# Patient Record
Sex: FEMALE | HISPANIC OR LATINO | Employment: UNEMPLOYED | URBAN - METROPOLITAN AREA
[De-identification: names, ages, dates, MRNs, and addresses within clinical notes are randomized per-mention and may not be internally consistent; named-entity substitution may affect disease eponyms.]

---
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
## 2024-10-01 ENCOUNTER — OFFICE VISIT (OUTPATIENT)
Dept: OPTOMETRY | Facility: CLINIC | Age: 19
End: 2024-10-01
Payer: COMMERCIAL

## 2024-10-01 DIAGNOSIS — H52.13 MYOPIA, BILATERAL: Primary | ICD-10-CM

## 2024-10-01 PROCEDURE — 92004 COMPRE OPH EXAM NEW PT 1/>: CPT | Performed by: OPTOMETRIST

## 2024-10-01 PROCEDURE — 92015 DETERMINE REFRACTIVE STATE: CPT | Performed by: OPTOMETRIST

## 2024-10-01 ASSESSMENT — REFRACTION_MANIFEST
OS_SPHERE: -1.75
OD_SPHERE: -1.25
OD_AXIS: 087
OS_CYLINDER: +0.75
OS_AXIS: 080
OD_CYLINDER: +0.25
OS_CYLINDER: +0.75
OD_SPHERE: -1.50
OS_AXIS: 080
OD_CYLINDER: +0.50
OS_SPHERE: -1.50
OD_AXIS: 090

## 2024-10-01 ASSESSMENT — CONF VISUAL FIELD
OS_SUPERIOR_TEMPORAL_RESTRICTION: 0
OS_INFERIOR_TEMPORAL_RESTRICTION: 0
OS_SUPERIOR_NASAL_RESTRICTION: 0
OS_NORMAL: 1
OD_SUPERIOR_NASAL_RESTRICTION: 0
OS_INFERIOR_NASAL_RESTRICTION: 0
OD_INFERIOR_NASAL_RESTRICTION: 0
OD_NORMAL: 1
OD_SUPERIOR_TEMPORAL_RESTRICTION: 0
OD_INFERIOR_TEMPORAL_RESTRICTION: 0

## 2024-10-01 ASSESSMENT — CUP TO DISC RATIO
OS_RATIO: 0.2
OD_RATIO: 0.2

## 2024-10-01 ASSESSMENT — KERATOMETRY
OD_K2POWER_DIOPTERS: 43.25
OS_AXISANGLE2_DEGREES: 174
OD_AXISANGLE_DEGREES: 85
OS_K1POWER_DIOPTERS: 42.00
OS_AXISANGLE_DEGREES: 84
OD_AXISANGLE2_DEGREES: 175
OS_K2POWER_DIOPTERS: 44.00
OD_K1POWER_DIOPTERS: 42.25

## 2024-10-01 ASSESSMENT — VISUAL ACUITY
METHOD: SNELLEN - LINEAR
OD_SC: 20/40
OD_SC: J1
OS_SC: J1
OS_SC: 20/40

## 2024-10-01 ASSESSMENT — SLIT LAMP EXAM - LIDS
COMMENTS: NORMAL
COMMENTS: NORMAL

## 2024-10-01 ASSESSMENT — EXTERNAL EXAM - LEFT EYE: OS_EXAM: NORMAL

## 2024-10-01 ASSESSMENT — TONOMETRY: IOP_METHOD: BOTH EYES NORMAL BY PALPATION

## 2024-10-01 ASSESSMENT — EXTERNAL EXAM - RIGHT EYE: OD_EXAM: NORMAL

## 2024-10-01 NOTE — PROGRESS NOTES
Chief Complaint   Patient presents with    Refraction     Here for refraction.     Jarad Bullock - Optometric Assistant      OBJECTIVE: See Ophthalmology exam    ASSESSMENT:    ICD-10-CM    1. Myopia, bilateral - Both Eyes  H52.13         PLAN:  Patient Instructions   Myopia is a result of long eyes. It is commonly referred to as near-sightedness. Seeing clearly in the distance is the main challenge.    Astigmatism results from curvature differential in the cornea and crystalline lens which can cause a distorted image, as light rays are prevented from meeting at a common focus.    Eyeglass prescription given.    Recommend annual eye exams.      Shireen Gutiérrez O.D.  [facility] [place]   [address] [place] [address]    [phone]

## 2024-10-01 NOTE — LETTER
10/1/2024      Vy Gonzalez  [address]  [place] [address]      Dear Colleague,    Thank you for referring your patient, Vy Gonzalez, to the [facility]. Please see a copy of my visit note below.    Chief Complaint   Patient presents with     Refraction     Here for refraction.     Jarad Bullock - Optometric Assistant      OBJECTIVE: See Ophthalmology exam    ASSESSMENT:    ICD-10-CM    1. Myopia, bilateral - Both Eyes  H52.13         PLAN:  Patient Instructions   Myopia is a result of long eyes. It is commonly referred to as near-sightedness. Seeing clearly in the distance is the main challenge.    Astigmatism results from curvature differential in the cornea and crystalline lens which can cause a distorted image, as light rays are prevented from meeting at a common focus.    Eyeglass prescription given.    Recommend annual eye exams.      Shireen Gutiérrez O.D.  [facility]   [address] [place] [address]    [phone]          Again, thank you for allowing me to participate in the care of your patient.        Sincerely,        Shireen Gutiérrez OD

## 2024-10-01 NOTE — PATIENT INSTRUCTIONS
Myopia is a result of long eyes. It is commonly referred to as near-sightedness. Seeing clearly in the distance is the main challenge.    Astigmatism results from curvature differential in the cornea and crystalline lens which can cause a distorted image, as light rays are prevented from meeting at a common focus.    Eyeglass prescription given.    Recommend annual eye exams.      Shireen Gutiérrez O.D.  [address] [address] [phone]

## 2024-10-11 ENCOUNTER — APPOINTMENT (OUTPATIENT)
Dept: OPTOMETRY | Facility: CLINIC | Age: 19
End: 2024-10-11
Payer: COMMERCIAL

## 2024-10-11 PROCEDURE — 92340 FIT SPECTACLES MONOFOCAL: CPT | Performed by: OPTOMETRIST

## 2025-03-03 ENCOUNTER — TELEPHONE (OUTPATIENT)
Dept: OBGYN | Facility: CLINIC | Age: 20
End: 2025-03-03
Payer: COMMERCIAL

## 2025-03-03 NOTE — TELEPHONE ENCOUNTER
[facility] Call Center    Phone Message    May a detailed message be left on voicemail: yes     Reason for Call: Appointment Intake    Referring Provider Name: Self referred   Diagnosis and/or Symptoms: Miscarriage management     Pt is requesting to schedule for miscarriage management. Please review and call pt to advise at # [phone].    Action Taken: Message routed to:  Other: RD OB    Travel Screening: Not Applicable     Date of Service:

## 2025-03-03 NOTE — TELEPHONE ENCOUNTER
Pt called to get scheduled for visit with a provider to be evaluated due to SAB x2.  Sent to the  to set up appointments

## 2025-03-13 ENCOUNTER — OFFICE VISIT (OUTPATIENT)
Dept: OBGYN | Facility: CLINIC | Age: 20
End: 2025-03-13
Attending: ADVANCED PRACTICE MIDWIFE
Payer: COMMERCIAL

## 2025-03-13 VITALS
DIASTOLIC BLOOD PRESSURE: 77 MMHG | HEIGHT: 61 IN | SYSTOLIC BLOOD PRESSURE: 116 MMHG | WEIGHT: 104.9 LBS | BODY MASS INDEX: 19.8 KG/M2 | HEART RATE: 93 BPM

## 2025-03-13 DIAGNOSIS — Z11.3 SCREEN FOR STD (SEXUALLY TRANSMITTED DISEASE): ICD-10-CM

## 2025-03-13 DIAGNOSIS — Z84.81 FAMILY HISTORY OF GENETIC DISEASE CARRIER: ICD-10-CM

## 2025-03-13 DIAGNOSIS — Z00.00 ROUTINE GENERAL MEDICAL EXAMINATION AT A HEALTH CARE FACILITY: Primary | ICD-10-CM

## 2025-03-13 DIAGNOSIS — O03.9 SAB (SPONTANEOUS ABORTION): ICD-10-CM

## 2025-03-13 PROCEDURE — 87491 CHLMYD TRACH DNA AMP PROBE: CPT | Performed by: ADVANCED PRACTICE MIDWIFE

## 2025-03-13 RX ORDER — OMEGA-3 FATTY ACIDS/FISH OIL 300-1000MG
CAPSULE ORAL
COMMUNITY

## 2025-03-13 ASSESSMENT — ANXIETY QUESTIONNAIRES
1. FEELING NERVOUS, ANXIOUS, OR ON EDGE: SEVERAL DAYS
GAD7 TOTAL SCORE: 2
5. BEING SO RESTLESS THAT IT IS HARD TO SIT STILL: NOT AT ALL
2. NOT BEING ABLE TO STOP OR CONTROL WORRYING: NOT AT ALL
7. FEELING AFRAID AS IF SOMETHING AWFUL MIGHT HAPPEN: NOT AT ALL
8. IF YOU CHECKED OFF ANY PROBLEMS, HOW DIFFICULT HAVE THESE MADE IT FOR YOU TO DO YOUR WORK, TAKE CARE OF THINGS AT HOME, OR GET ALONG WITH OTHER PEOPLE?: NOT DIFFICULT AT ALL
3. WORRYING TOO MUCH ABOUT DIFFERENT THINGS: NOT AT ALL
6. BECOMING EASILY ANNOYED OR IRRITABLE: SEVERAL DAYS
GAD7 TOTAL SCORE: 2
4. TROUBLE RELAXING: NOT AT ALL
IF YOU CHECKED OFF ANY PROBLEMS ON THIS QUESTIONNAIRE, HOW DIFFICULT HAVE THESE PROBLEMS MADE IT FOR YOU TO DO YOUR WORK, TAKE CARE OF THINGS AT HOME, OR GET ALONG WITH OTHER PEOPLE: NOT DIFFICULT AT ALL
7. FEELING AFRAID AS IF SOMETHING AWFUL MIGHT HAPPEN: NOT AT ALL
GAD7 TOTAL SCORE: 2

## 2025-03-13 NOTE — PROGRESS NOTES
Progress Note    SUBJECTIVE:  Vy Gonzalez is an 19 year old, , who requests an Annual Preventive Exam.       Concerns today include:   Paragard IUD in place x 2 months, mild cramping and managable flow  Hx of reg menses  hx of SAB x 2, early losses  Her mother has told her that she is a carrier for a genetic/chromosome abnormality, unsure what it is.  Worried this is connected to her 2 early SABs  Has had the same male partner for 3 years and they have never used birth control, only one pregnancy that ended in early SAB in 2025. Worried that he may have fertility issues as well    Has been seen [facility], [facility]        Menstrual History:      3/13/2025     2:40 PM   Menstrual History   LAST MENSTRUAL PERIOD 2025       HISTORY:  Prescription Medications as of 3/13/2025         Rx Number Disp Refills Start End Last Dispensed Date Next Fill Date Owning Pharmacy    omega 3 1000 MG CAPS  -- --  --       Sig: Take by mouth.    Class: Historical    Route: Oral          No Known Allergies  Immunization History   Administered Date(s) Administered    COVID-19 Bivalent 12+ (Pfizer) 11/10/2022    COVID-19 Monovalent 12+ (Pfizer ) 2022, 2022    Comvax (HIB/HepB) 2005, 2005, 2005, 2005    DTAP (<7y) 2005, 2005, 2005, 2005, 2005, 2006    DTAP-IPV, <7Y (QUADRACEL/KINRIX) 2010    DTaP/HepB/IPV 2005    Flu, Unspecified 2011, 2012    D0w7-60 Novel Flu 2010, 2010    HIB (PRP-T) 2005, 2005, 2006    HPV Quadrivalent 07/15/2015, 2015    HPV9 07/15/2015, 2015, 2016    Hepatitis A (Vaqta/Havrix)(Peds 12m-18y) 2006, 2010    Historic Hib Hib-titer 2005, 2005    Influenza (H1N1) 2010, 2010    Influenza (IIV3) PF 2005, 2005, 2006    Influenza  Intranasal Vaccine 10/31/2014    Influenza Vaccine >6 months,quad, PF 10/17/2018, 10/21/2019, 2020, 2021, 11/10/2022, 2023    Influenza Vaccine, 6+MO IM (QUADRIVALENT W/PRESERVATIVES) 2015, 2016, 10/25/2017, 2021, 2023    Influenza, Split Virus, Trivalent, Pf (Fluzone\Fluarix) 10/11/2007, 2007, 2008, 10/17/2009, 10/23/2013, 2024    MMR (MMRII) 2005, 2009    Meningococcal ACWY (Menveo ) 2016, 2021    Meningococcal B (Bexsero ) 2023    Nasal Influenza Vaccine 2-49 (FluMist) 10/31/2014    Pneumococcal (PCV 7) 2005, 2005, 2005, 2005, 2005    Polio, Unspecified 2005, 2005, 2005    Poliovirus, inactivated (IPV) 2005, 2005, 2005, 2005, 2005    TDAP (Adacel,Boostrix) 07/15/2015    Typhoid IM 2021    Varicella (Varivax) 2006, 2009       OB History    Para Term  AB Living   2 0 0 0 2 0   SAB IAB Ectopic Multiple Live Births   2 0 0 0 0     Past Medical History:   Diagnosis Date    Spontaneous      age 14 and 18.    Venous lymphatic malformation      Past Surgical History:   Procedure Laterality Date    HEMANGIOMA EXCISION Right      Family History   Problem Relation Age of Onset    Diabetes Mother 30    No Known Problems Father     No Known Problems Sister     No Known Problems Sister     Autism Spectrum Disorder Brother     No Known Problems Brother     Diabetes Maternal Grandmother     Diabetes Maternal Uncle      Social History     Socioeconomic History    Marital status: Single     Spouse name: None    Number of children: None    Years of education: None    Highest education level: None   Occupational History    Occupation: . 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     Comment:  one partner     Social Drivers of Health     Financial Resource Strain: Not on File (3/12/2024)    Received from [facility]    Financial Resource Strain     Financial Resource Strain: 0   Food Insecurity: Not on File (2024)    Received from [facility]    Food Insecurity     Food: 0   Transportation Needs: Not on File (3/12/2024)    Received from [facility]    Transportation Needs     Transportation: 0   Physical Activity: Not on File (3/12/2024)    Received from [facility]    Physical Activity     Physical Activity: 0   Stress: Not on File (3/12/2024)    Received from [facility]    Stress     Stress: 0   Social Connections: Not on File (9/15/2024)    Received from [facility]    Social Connections     Connectedness: 0   Housing Stability: Not on File (3/12/2024)    Received from [facility]    Housing Stability     Housin       ROS       No data to display                  EXAM:  Blood pressure 116/77, pulse 93, height 1.549 m (5' 1\"), weight 47.6 kg (104 lb 14.4 oz), last menstrual period 2025. Body mass index is 19.82 kg/m .  General - pleasant female in no acute distress.  Skin - no suspicious lesions or rashes  EENT-  PERRLA, euthyroid with out palpable nodules  Neck - supple without lymphadenopathy.  Lungs - clear to auscultation bilaterally.  Heart - regular rate and rhythm without murmur.  Abdomen - soft, nontender, nondistended, no masses or organomegaly noted.  Musculoskeletal - no gross deformities.  Neurological - normal strength, sensation, and mental status.    Breast Exam:  Breast: Without visible skin changes. No dimpling or lesions seen.   Breasts supple, non-tender with palpation, no dominant mass, nodularity, or nipple discharge noted bilaterally. Axillary nodes negative.  Inverted nipples, most prominent on left side    Pelvic " Exam:  EG/BUS: Normal genital architecture without lesions, erythema or abnormal secretions Bartholin's, Urethra, [place]'s normal   Urethral meatus: normal   Urethra: no masses, tenderness, or scarring   Bladder: no masses or tenderness   Vagina: moist, pink, rugae with creamy, white, and odorless  secretions  Cervix: IUD strings extend 3 cm from external os.  Uterus: anteverted,  and small, smooth, firm, mobile w/o pain  Adnexa: Within normal limits and No masses, nodularity, tenderness  Rectum:anus normal       ASSESSMENT:  (Z00.00) Routine general medical examination at a health care facility  (primary encounter diagnosis)  Comment:   Plan: Adult Genetics & Metabolism  Referral,        Chlamydia trachomatis PCR, Neisseria         gonorrhoeae PCR            (Z84.81) Family history of genetic disease carrier  Comment:   Plan: Adult Genetics & Metabolism  Referral            (O03.9) SAB (spontaneous )  Comment:   Plan: Adult Genetics & Metabolism  Referral          No diagnosis found.       PLAN:   No orders of the defined types were placed in this encounter.       Additional teaching done at this visit regarding self breast exam, exercise, birth control, and preconception.    Return to clinic in one year.  Follow-up as needed.    Answers submitted by the patient for this visit:  Patient Health Questionnaire (G7) (Submitted on 3/13/2025)  CARLEE 7 TOTAL SCORE: 2  Rolanda Millan, DNP, APRN, CNM, FACNM

## 2025-03-13 NOTE — PATIENT INSTRUCTIONS
Thank you for trusting us with your care!   Please be aware, if you are on Mychart, you may see your results prior to your providers review. If labs are abnormal, we will call or message you on Mychart with a follow up plan.    If you need to contact us for questions about:  Symptoms, Scheduling & Medical Questions; Non-urgent (2-3 day response) Mychart message, Urgent (needing response today) [phone] (if after 3:30pm next day response)   Prescriptions: Please call your Pharmacy   Billing: Chace [phone] or [facility]:[phone]    Patient Education   Preventive Care Advice   This is general advice given by our system to help you stay healthy. However, your care team may have specific advice just for you. Please talk to your care team about your preventive care needs.  Nutrition  Eat 5 or more servings of fruits and vegetables each day.  Try wheat bread, brown rice and whole grain pasta (instead of white bread, rice, and pasta).  Get enough calcium and vitamin D. Check the label on foods and aim for 100% of the RDA (recommended daily allowance).  Lifestyle  Exercise at least 150 minutes each week  (30 minutes a day, 5 days a week).  Do muscle strengthening activities 2 days a week. These help control your weight and prevent disease.  No smoking.  Wear sunscreen to prevent skin cancer.  Have a dental exam and cleaning every 6 months.  Yearly exams  See your health care team every year to talk about:  Any changes in your health.  Any medicines your care team has prescribed.  Preventive care, family planning, and ways to prevent chronic diseases.  Shots (vaccines)   HPV shots (up to age 26), if you've never had them before.  Hepatitis B shots (up to age 59), if you've never had them before.  COVID-19 shot: Get this shot when it's due.  Flu shot: Get a flu shot every year.  Tetanus shot: Get a tetanus shot every 10 years.  Pneumococcal, hepatitis A, and RSV shots: Ask your care team if you need these  based on your risk.  Shingles shot (for age 50 and up)  General health tests  Diabetes screening:  Starting at age 35, Get screened for diabetes at least every 3 years.  If you are younger than age 35, ask your care team if you should be screened for diabetes.  Cholesterol test: At age 39, start having a cholesterol test every 5 years, or more often if advised.  Bone density scan (DEXA): At age 50, ask your care team if you should have this scan for osteoporosis (brittle bones).  Hepatitis C: Get tested at least once in your life.  STIs (sexually transmitted infections)  Before age 24: Ask your care team if you should be screened for STIs.  After age 24: Get screened for STIs if you're at risk. You are at risk for STIs (including HIV) if:  You are sexually active with more than one person.  You don't use condoms every time.  You or a partner was diagnosed with a sexually transmitted infection.  If you are at risk for HIV, ask about PrEP medicine to prevent HIV.  Get tested for HIV at least once in your life, whether you are at risk for HIV or not.  Cancer screening tests  Cervical cancer screening: If you have a cervix, begin getting regular cervical cancer screening tests starting at age 21.  Breast cancer scan (mammogram): If you've ever had breasts, begin having regular mammograms starting at age 40. This is a scan to check for breast cancer.  Colon cancer screening: It is important to start screening for colon cancer at age 45.  Have a colonoscopy test every 10 years (or more often if you're at risk) Or, ask your provider about stool tests like a FIT test every year or Cologuard test every 3 years.  To learn more about your testing options, visit:   .  For help making a decision, visit:   https://bit.ly/jc12632.  Prostate cancer screening test: If you have a prostate, ask your care team if a prostate cancer screening test (PSA) at age 55 is right for you.  Lung cancer screening: If you are a current or former  smoker ages 50 to 80, ask your care team if ongoing lung cancer screenings are right for you.  For informational purposes only. Not to replace the advice of your health care provider. Copyright   2023 [facility]. All rights reserved. Clinically reviewed by the [facility] Transitions Program. [employer] [id number] - REV 01/24.

## 2025-03-13 NOTE — LETTER
3/13/2025       RE: Vy Gonzalez  [address]  [place] [address]     Dear Colleague,    Thank you for referring your patient, Vy Gonzalez, to the [facility] [place] at [facility]. Please see a copy of my visit note below.        Progress Note    SUBJECTIVE:  Vy Gonzalez is an 19 year old, , who requests an Annual Preventive Exam.       Concerns today include:   Paragard IUD in place x 2 months, mild cramping and managable flow  Hx of reg menses  hx of SAB x 2, early losses  Her mother has told her that she is a carrier for a genetic/chromosome abnormality, unsure what it is.  Worried this is connected to her 2 early SABs  Has had the same male partner for 3 years and they have never used birth control, only one pregnancy that ended in early SAB in 2025. Worried that he may have fertility issues as well    Has been seen at [facility] years, [facility]        Menstrual History:      3/13/2025     2:40 PM   Menstrual History   LAST MENSTRUAL PERIOD 2025       HISTORY:  Prescription Medications as of 3/13/2025         Rx Number Disp Refills Start End Last Dispensed Date Next Fill Date Owning Pharmacy    omega 3 1000 MG CAPS  -- --  --       Sig: Take by mouth.    Class: Historical    Route: Oral          No Known Allergies  Immunization History   Administered Date(s) Administered     COVID-19 Bivalent 12+ (Pfizer) 11/10/2022     COVID-19 Monovalent 12+ (Pfizer ) 2022, 2022     Comvax (HIB/HepB) 2005, 2005, 2005, 2005     DTAP (<7y) 2005, 2005, 2005, 2005, 2005, 2006     DTAP-IPV, <7Y (QUADRACEL/KINRIX) 2010     DTaP/HepB/IPV 2005     Flu, Unspecified 2011, 2012     S7o0-41 Novel Flu 2010, 2010     HIB (PRP-T)  2005, 2005, 2006     HPV Quadrivalent 07/15/2015, 2015     HPV9 07/15/2015, 2015, 2016     Hepatitis A (Vaqta/Havrix)(Peds 12m-18y) 2006, 2010     Historic Hib Hib-titer 2005, 2005     Influenza (H1N1) 2010, 2010     Influenza (IIV3) PF 2005, 2005, 2006     Influenza Intranasal Vaccine 10/31/2014     Influenza Vaccine >6 months,quad, PF 10/17/2018, 10/21/2019, 2020, 2021, 11/10/2022, 2023     Influenza Vaccine, 6+MO IM (QUADRIVALENT W/PRESERVATIVES) 2015, 2016, 10/25/2017, 2021, 2023     Influenza, Split Virus, Trivalent, Pf (Fluzone\Fluarix) 10/11/2007, 2007, 2008, 10/17/2009, 10/23/2013, 2024     MMR (MMRII) 2005, 2009     Meningococcal ACWY (Menveo ) 2016, 2021     Meningococcal B (Bexsero ) 2023     Nasal Influenza Vaccine 2-49 (FluMist) 10/31/2014     Pneumococcal (PCV 7) 2005, 2005, 2005, 2005, 2005     Polio, Unspecified 2005, 2005, 2005     Poliovirus, inactivated (IPV) 2005, 2005, 2005, 2005, 2005     TDAP (Adacel,Boostrix) 07/15/2015     Typhoid IM 2021     Varicella (Varivax) 2006, 2009       OB History    Para Term  AB Living   2 0 0 0 2 0   SAB IAB Ectopic Multiple Live Births   2 0 0 0 0     Past Medical History:   Diagnosis Date     Spontaneous      age 14 and 18.     Venous lymphatic malformation      Past Surgical History:   Procedure Laterality Date     HEMANGIOMA EXCISION Right      Family History   Problem Relation Age of Onset     Diabetes Mother 30     No Known Problems Father      No Known Problems Sister      No Known Problems Sister      Autism Spectrum Disorder Brother      No Known Problems Brother      Diabetes Maternal Grandmother      Diabetes Maternal Uncle      Social History  "    Socioeconomic History     Marital status: Single     Spouse name: None     Number of children: None     Years of education: None     Highest education level: None   Occupational History     Occupation: . 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     Comment:  one partner     Social Drivers of Health     Financial Resource Strain: Not on File (3/12/2024)    Received from [facility]    Financial Resource Strain      Financial Resource Strain: 0   Food Insecurity: Not on File (2024)    Received from [facility]    Food Insecurity      Food: 0   Transportation Needs: Not on File (3/12/2024)    Received from [facility]    Transportation Needs      Transportation: 0   Physical Activity: Not on File (3/12/2024)    Received from [facility]    Physical Activity      Physical Activity: 0   Stress: Not on File (3/12/2024)    Received from [facility]    Stress      Stress: 0   Social Connections: Not on File (9/15/2024)    Received from [facility]    Social Connections      Connectedness: 0   Housing Stability: Not on File (3/12/2024)    Received from [employer] [facility]    Housing Stability      Housin       ROS       No data to display                  EXAM:  Blood pressure 116/77, pulse 93, height 1.549 m (5' 1\"), weight 47.6 kg (104 lb 14.4 oz), last menstrual period 2025. Body mass index is 19.82 kg/m .  General - pleasant female in no acute distress.  Skin - no suspicious lesions or rashes  EENT-  PERRLA, euthyroid with out palpable nodules  Neck - supple without lymphadenopathy.  Lungs - clear to auscultation bilaterally.  Heart - regular rate and rhythm without murmur.  Abdomen - soft, nontender, nondistended, no " masses or organomegaly noted.  Musculoskeletal - no gross deformities.  Neurological - normal strength, sensation, and mental status.    Breast Exam:  Breast: Without visible skin changes. No dimpling or lesions seen.   Breasts supple, non-tender with palpation, no dominant mass, nodularity, or nipple discharge noted bilaterally. Axillary nodes negative.  Inverted nipples, most prominent on left side    Pelvic Exam:  EG/BUS: Normal genital architecture without lesions, erythema or abnormal secretions Bartholin's, Urethra, [place]'s normal   Urethral meatus: normal   Urethra: no masses, tenderness, or scarring   Bladder: no masses or tenderness   Vagina: moist, pink, rugae with creamy, white, and odorless  secretions  Cervix: IUD strings extend 3 cm from external os.  Uterus: anteverted,  and small, smooth, firm, mobile w/o pain  Adnexa: Within normal limits and No masses, nodularity, tenderness  Rectum:anus normal       ASSESSMENT:  (Z00.00) Routine general medical examination at a health care facility  (primary encounter diagnosis)  Comment:   Plan: Adult Genetics & Metabolism  Referral,        Chlamydia trachomatis PCR, Neisseria         gonorrhoeae PCR            (Z84.81) Family history of genetic disease carrier  Comment:   Plan: Adult Genetics & Metabolism  Referral            (O03.9) SAB (spontaneous )  Comment:   Plan: Adult Genetics & Metabolism  Referral          No diagnosis found.       PLAN:   No orders of the defined types were placed in this encounter.       Additional teaching done at this visit regarding self breast exam, exercise, birth control, and preconception.    Return to clinic in one year.  Follow-up as needed.    Answers submitted by the patient for this visit:  Patient Health Questionnaire (G7) (Submitted on 3/13/2025)  CALREE 7 TOTAL SCORE: 2  Rolanda Millan, DNP, APRN, CNM, FACNM      Again, thank you for allowing me to participate in the care of your  patient.      Sincerely,    KELSEA KeithM

## 2025-03-17 ENCOUNTER — NURSE TRIAGE (OUTPATIENT)
Dept: NURSING | Facility: CLINIC | Age: 20
End: 2025-03-17
Payer: COMMERCIAL

## 2025-03-17 NOTE — TELEPHONE ENCOUNTER
Nurse Triage SBAR    Is this a 2nd Level Triage? NO    Situation:   Blood exposure    Background:   Pt works in lab at [employer]  She got blood in her mouth    Assessment:   She did contact occupational health.  Labs were drawn and supervisor was notified.  Pt is calling to see if there is anything else she should do.    Protocol Recommended Disposition:   Emergency department    Recommendation:   Informed patient that she did what guidelines suggest which is to speak to employer/occupational health.  Main concern is bloodborne viruses.  She states she was drawn for hepatitis and HIV as far as she knows.    Nathaly Delvalle, RN, BSN Nurse Triage Advisor 3/17/2025 7:04 PM       Reason for Disposition   [1] EXPOSURE of eye, mouth, or other mucous membrane AND [2] with blood or body fluid containing visible blood    Additional Information   Negative: [1] EXPOSURE to a body fluid AND [2] from needlestick or a wound from a sharp object   Negative: [1] EXPOSURE to a body fluid AND [2] SOURCE has AIDS or is HIV positive   Negative: [1] EXPOSURE of non-intact skin (e.g., exposed person has dermatitis, abrasion, wound) AND [2] with blood or body fluid containing visible blood    Protocols used: Blood and Body Fluid Exposure - [id number]

## 2025-03-18 ENCOUNTER — TRANSCRIBE ORDERS (OUTPATIENT)
Dept: MATERNAL FETAL MEDICINE | Facility: CLINIC | Age: 20
End: 2025-03-18
Payer: COMMERCIAL

## 2025-03-18 DIAGNOSIS — Z31.69 ENCOUNTER FOR PRECONCEPTION CONSULTATION: Primary | ICD-10-CM

## 2025-03-19 ENCOUNTER — TELEPHONE (OUTPATIENT)
Dept: OBGYN | Facility: CLINIC | Age: 20
End: 2025-03-19
Payer: COMMERCIAL

## 2025-03-19 NOTE — TELEPHONE ENCOUNTER
Patient calling due to having cramping 7/10 on the pain scale and cannot feel her IUD strings. Sent to UC/ ED for evaluation of IUD placement.

## 2025-03-24 ENCOUNTER — MYC MEDICAL ADVICE (OUTPATIENT)
Dept: OBGYN | Facility: CLINIC | Age: 20
End: 2025-03-24
Payer: COMMERCIAL

## 2025-03-25 NOTE — TELEPHONE ENCOUNTER
"Patient last seen 3/13 with Rolanda Millan CNM for annual preventative exam. Paraguard IUD noted to be in place for 2 months at time of visit. Provider noted: \"mild cramping and manageable flow.\"      "

## 2025-03-26 ENCOUNTER — OFFICE VISIT (OUTPATIENT)
Dept: MATERNAL FETAL MEDICINE | Facility: CLINIC | Age: 20
End: 2025-03-26
Attending: OBSTETRICS & GYNECOLOGY
Payer: COMMERCIAL

## 2025-03-26 ENCOUNTER — MEDICAL CORRESPONDENCE (OUTPATIENT)
Dept: HEALTH INFORMATION MANAGEMENT | Facility: CLINIC | Age: 20
End: 2025-03-26

## 2025-03-26 ENCOUNTER — LAB (OUTPATIENT)
Dept: LAB | Facility: CLINIC | Age: 20
End: 2025-03-26
Attending: OBSTETRICS & GYNECOLOGY
Payer: COMMERCIAL

## 2025-03-26 DIAGNOSIS — Z81.0 FAMILY HISTORY OF INTELLECTUAL DISABILITIES: ICD-10-CM

## 2025-03-26 DIAGNOSIS — Z31.69 ENCOUNTER FOR PRECONCEPTION CONSULTATION: ICD-10-CM

## 2025-03-26 DIAGNOSIS — Z31.430 ENCOUNTER OF FEMALE FOR TESTING FOR GENETIC DISEASE CARRIER STATUS FOR PROCREATIVE MANAGEMENT: Primary | ICD-10-CM

## 2025-03-26 DIAGNOSIS — Z31.430 ENCOUNTER OF FEMALE FOR TESTING FOR GENETIC DISEASE CARRIER STATUS FOR PROCREATIVE MANAGEMENT: ICD-10-CM

## 2025-03-26 PROCEDURE — 36415 COLL VENOUS BLD VENIPUNCTURE: CPT

## 2025-03-26 PROCEDURE — 96041 GENETIC COUNSELING SVC EA 30: CPT

## 2025-03-26 NOTE — PROGRESS NOTES
"[facility]  Genetic Counseling Consult    Patient:  Vy Gonzalez YOB: 2005   Date of Service:  3/26/25   MRN: [id number]    Vy was seen at the [facility] for preconception genetic consultation. The indication for genetic counseling is family history concern and desire to discuss carrier screening . The patient was unaccompanied to this visit.     The session was conducted in English.      IMPRESSION/ PLAN   During today's [facility] visit, Vy had a genetic counseling session and blood draw for carrier screening. Results are expected in 2-3 weeks. The patient will be called with results and if they do not answer they requested a detailed message with results on their voicemail.Patient was informed that results will be available in [facility].     PREGNANCY HISTORY   /Parity:      Vy wang pregnancy history is significant for two early SAB at 6w in  and 4w in .   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       FAMILY HISTORY   A three-generation pedigree was obtained today and is scanned under the \"Media\" tab in Epic. The family history was reported by Vy.    The following significant findings were reported today:   Vy has a maternal half brother who is 11, has mild autism spectrum disorder, and a known genetic condition described as \"something to do with bilirubin metabolism.\" Records could not be obtained yet.  Vy's mother and both maternal grandparents have type 2 diabetes.   Vy has a maternal aunt who has 5 living and healthy daughters but a history of up to 10 pregnancy losses, including some  deaths. Vy's other aunt may have had up to 3 pregnancy losses and has 5 living sons. "   Vy's partner's brother has an intellectual disability/learning disability, he is 16 and does not talk much, he was reported to have a small heart and a lessened life expectancy. Vy does not believe her partner's mother ever opted to do any genetic testing so a genetic condition cannot be ruled out. Of note, Vy reported her partner's other brother shares similar facial features to the brother with ID but has no other symptoms that she knows of.   Vy's partner's mother has type 2 DM.    Family history of unknown genetic condition    Intellectual disability (ID) is a neurodevelopmental disorder that affects approximately one percent of the population. It is characterized by deficits in intellectual and adaptive functioning with varying severity presenting before the age of 18. ID can be a broad spectrum and have multiple etiologies. The etiologies can include both genetic and environmental factors. Often times ID is also associated with other medical conditions such as heart defects, cerebral palsy, endocrine abnormalities, hearing and/or vision loss, and sleep disorders.     Genetic causes of intellectual disability may be defined as a particular syndrome or genetic factor. In this case, a review of family history and genetic testing of parents or siblings can determine if this genetic change was hereditary or sporadic (de artie). In a case of ID that is considered de artie, the chance of recurrence is low. Therefore, a family history of such ID would not increase risks for relatives. However, if a case of ID is considered hereditary, recurrence risks would depend on the type of inheritance which includes autosomal dominant, autosomal recessive, X-linked, and mitochondrial. In some novel cases, genetic testing is unable to determine a cause and family history may be the most useful tool to understand its inheritance.     If a family history includes an individual with ID but unknown diagnosis, it is  difficult to provide an accurate risk assessment. If the individual has other health conditions or features it can help narrow down the possibility. If Vy can get the records of the genetic testing done in her family, we can provide better risk assessment in the future. Additionally, if the name of the diagnosis is unknown but the family history predicts a typical mode of inheritance, the risk can be estimated. If the family history is otherwise negative, a de artie cause is most likely. Therefore, this would not increase risks for other family members to be similarly affected. We reviewed that risks for recurrence could not be eliminated. If more information is collected, it would be reasonable to revisit this family history to provide a more accurate risk assessment in the future.     [id number]-[id number] Conditions  After researching genetic conditions related to bilirubin metabolism after our appointment, UGT1A1 related conditions could be a possible explanation for Vy's brother's condition.     Gilbert syndrome is a relatively common mild condition that is characterized by intermittent periods of mildly elevated unconjugated hyperbilirubinemia. Some individuals may occasionally have symptomatic periods such as jaundice, abdominal discomfort, or fatigue, particularly during periods of stress. However, 30% of individuals with Gilbert syndrome are asymptomatic and are only diagnosed incidentally. Gilbert syndrome is generally a benign condition that does not require treatment.      Gilbert syndrome is caused by mutations in the UGT1A1 gene, which provides instructions for the bilirubin uridine diphosphate glucoronosyltransferase (bilirubin-UGT) enzyme. Bilirubin-UGT is necessary for the removal of bilirubin from the body. Individuals with Gilbert syndrome have reduced bilirubin-UGT enzyme production or function due to mutations in the UGT1A1 gene.      Gilbert syndrome is one condition in a spectrum of  [id number] conditions. SHN2H9-dbbvhyr conditions also include Crigler-Najjar syndrome types I and II. Crigler-Najjar syndrome type I is the most severe of these conditions and is caused by complete absence of bilirubin-UGT activity, which results in hyperbilirubinemia in the  period. Symptoms can include severe jaundice and kernicterus (accumulation of unconjugated bilirubin in the brain), which can be life threatening and can result in intellectual or neurologic impairment when untreated. Crigler-Najjar syndrome type II is characterized by a similar, though often less severe presentation to Crigler-Najjar syndrome type I due to some residual enzyme activity in type II.      Gilbert syndrome can be inherited in an autosomal recessive or autosomal dominant pattern.   In the autosomal recessive pattern, individuals most commonly homozygous for the UGT1A1*28 variant in the promoter region of UGT1A1, which results in reduced production of bilirubin-UGT. This is the most common form of Gilbert syndrome in individuals with  ancestry.   Studies have also shown that a Gilbert syndrome phenotype can be caused by a heterozygous mutation in the coding regions of the UGT1A1 gene, in which case Gilbert syndrome would be inherited in an autosomal dominant pattern. However, an individual with autosomal dominant Gilbert syndrome would also be considered a carrier of Crigler-Najjar syndrome.      Crigler-Najjar syndrome is inherited in an autosomal recessive pattern, meaning that an individual would need to have a mutation in both copies of the UGT1A1 gene in order to have this condition. Some cases have been published describing individuals with Crigler-Najjar type II with a combined genotype with one UGT1A1 coding variant on one allele and a Gilbert-type promoter defect on the other allele. This condition is not on the [facility] carrier screening panel and would need to be ordered through [facility]  Genetics.     Family history of diabetes    Diabetes is a complex genetic trait (a multifactorial condition) caused by the combination of genetic and environmental factors. Having a family history of diabetes can increase one's risk of also developing diabetes. For men with type I diabetes, their children have a 1 in 17 chance of developing diabetes during their lifetime. However, for woman with type I diabetes, if their child is born before 25 there is a 1 in 25 risk and if their child was born after 25 there is a 1 in 100 risk. If a parent develops diabetes before age 11, their child's risk is typically doubled. Furthermore, if both parents have type I diabetes, the risk is between 1 in 10 to 1 in 4. These risk numbers are an estimate and can be complicated by many other factors such as autoimmune disorders and lifestyle choices. Therefore, there is currently no prenatal genetic testing we would offer the patient at this time to assess for diabetes risks in the current pregnancy. We also reviewed that individuals with a family history of type II diabetes are generally thought to be at increased risk to develop type II diabetes. Therefore, it is important for individuals with a family history of type II diabetes to let their physicians to know about this family history, so they can be appropriate screened for diabetes.     Family history of recurrent pregnancy loss    Recurrent miscarriage is defined as three or more clinically recognized consecutive or non-consecutive pregnancy losses occurring prior to fetal viability (<24 weeks). Vy's personal history is significant for 2 pregnancy losses at 4 and 6 weeks.      While she does not meet typical criteria for recurrent pregnancy loss, based on the health provider's professional judgment, evaluation of couples with two miscarriages may be initiated when deemed necessary especially when taking into account the woman's age and length of time that the couple spent  trying to have a successful pregnancy.    At least 10-15% of the recognized pregnancies end in miscarriage, with most losses occurring in the first trimester. The rate of miscarriage less than 8 weeks gestation may be even greater as some women may not recognize that they are pregnant. Around half of miscarriages that occur before 20 weeks gestation are caused by chromosome abnormalities. Of these chromosomally atypical fetuses, ~ approximately 60% have an autosomal trisomy with trisomy 16 being the most common. Monosomy X is the second most common chromosomal etiology, accounting for ~20%, and triploidy accounts for ~15%. Polyploidies and structural rearrangements constitute the remainder. The risk for a miscarriage increases with advancing maternal age due to a higher incidence of conceptuses with a chromosomal aneuploidy. The risk may approach 75% in women who are 45 years of age and older.     Familial chromosome rearrangements can lead to a history of recurrent pregnancy loss or infertility. They can also increase the potential risk for a stillbirth or liveborn babies with birth differences, developmental concerns, or other health problems. In this case a parent would carry a balanced rearrangement with an equal exchange of chromosome material. An individual with a balanced translocation is typically healthy. However, a child of this individual may inherit one of the rearranged chromosomes, along with typical chromosomes, resulting in an unbalanced rearrangement with total loss and gain of chromosome material. If a conceptus has unbalanced chromosome material it could lead to miscarriages or an affected child. This result can be dependent on the amount of material that is gained or loss and what chromosome the material is from. If a chromosome translocation is inherited through a family there are typically multiple individuals over multiple generations with recurrent pregnancy loss and/or affected individuals.  However, it is also possible for a de artie translocation or other rearrangement to occur. For couples who have experienced three or more unexplained pregnancy losses, it has been estimated that around 2-5% of these couples may carry a rearrangement. Chromosome rearrangements causing pregnancy loss are more common in the female partner. Chromosome rearrangements in males are more likely to cause infertility. Chromosome analysis on parents is possible by obtaining a karyotype on peripheral blood.     Other genetic causes of recurrent loss could include lethal autosomal recessive conditions of x-linked dominant conditions. Non-genetic causes of pregnancy loss can include maternal uterine anomalies, endocrine concerns such as diabetes or thyroid disease, antiphospholipid syndrome, and environmental agents. Please see [facility] consult for further details.     In about 50% of couples with recurrent pregnancy loss, the etiology remains unknown despite a thorough evaluation and is therefore classified as idiopathic. It is estimated that couples with idiopathic recurrent pregnacy loss can have up to a 75% chance of having a successful pregnancy.     Otherwise, the reported family history is unremarkable for multiple miscarriages, stillbirths, birth defects, intellectual disabilities, known genetic conditions, and consanguinity.       CARRIER SCREENING   Expanded carrier screening is available to screen for autosomal recessive conditions and X-linked conditions in a large list of genes. Autosomal recessive conditions happen when a mutation has been inherited from the egg and sperm and include conditions like cystic fibrosis, thalassemia, hearing loss, spinal muscular atrophy, and more. X-linked conditions happen when a mutation has been inherited from the egg and include conditions like fragile X syndrome. [place] screening was also reviewed. About [place] Screening    The patient elected to pursue carrier screening today. We  discussed the following:   Carrier screening does not test the pregnancy but gives a risk assessment for the pregnancy and future pregnancies to have the condition  There are different size panels or list of conditions for carrier screening. The patient elected for [employer] [place] panel covering 176 conditions.   Some conditions cause health problems for carriers  Carrier screening does not test for all genetic and health conditions or risk factors  There are limitations to current technology and results may be updated at a later date  The results typically take 2-3 weeks. They will be available in EPIC and routed to the referring OB provider. The patient can view them in "[employer]" and the lab's patient portal.  The patient's partner will be recommended to have carrier screening if the patient is found to be a carrier for an autosomal recessive condition.  If an individual is a carrier, family members could be as well. The patient is encouraged to share this information with relatives.  The lab will communicate the out-of-pocket cost with the patient and will also provide an option to switch to self-pay. The default is billing through insurance, and it is the patient's responsibility to respond to the communication if they would like to switch to self-pay.  We discussed that Vy is encouraged to contact me with any additional information she learns about her mother or brother's genetic findings so we can give her better risk assessments. If this condition is a recessive condition and not screened for on this panel, it may be possible to add the extra condition.        Per our conversation, below are some reproductive medicine and infertility clinics if Vy is interested in fertility workups or discussing the options of IVF and PGT:    CRM: [address], [address]       [place] [address]                        Phone: [phone]     RMIA: [facility]  [address]         [place] [address]           Phone: [phone]     CCRM: [address] [facility]   [address], [place] [address]   Phone: [phone]    ShapeEmail: [email or web address]           RISK ASSESSMENT FOR CHROMOSOME CONDITIONS   We explained that the risk for fetal chromosome abnormalities increases with maternal age. We discussed specific features of common chromosome abnormalities, including Down syndrome, trisomy 13, trisomy 18, and sex chromosome trisomies. We discussed the following risks for future pregnancies     At age 20 at midtrimester, the risk to have a baby with Down syndrome is 1 in 1176.  At age 20 at midtrimester, the risk to have a baby with any chromosome abnormality is 1 in 588.     GENETIC TESTING OPTIONS   We discussed genetic testing during a pregnancy includes screening and diagnostic procedures.     Screening tests are non-invasive which means no risk to the pregnancy and includes ultrasounds and blood work. The benefits and limitations of screening were reviewed. Screening tests provide a risk assessment (chance) specific to the pregnancy for certain fetal chromosome abnormalities but cannot definitively diagnose or exclude a fetal chromosome abnormality. Follow-up genetic counseling and consideration of diagnostic testing is recommended with any abnormal screening result. Diagnostic testing during a pregnancy is more certain and can test for more conditions. However, the tests do have a risk of miscarriage that requires careful consideration. These tests can detect fetal chromosome abnormalities with greater than 99% certainty. These tests can detect fetal chromosome abnormalities with greater than 99% certainty. Results can be compromised by maternal cell contamination or mosaicism and are limited by the resolution of current genetic testing technology.     There is no screening or  diagnostic test that detects all forms of birth defects or intellectual disability.     We discussed the currently available options for future pregnancies. Due to advancements there may be new or different options available at the time of a pregnancy. A new genetic counseling encounter in that pregnancy could review the current options and updates.     We discussed the following screening options:     Non-invasive prenatal testing (NIPT)  Also called cell-free DNA screening because it detects chromosomes from the placenta in the pregnant person's blood  Can be done any time after 10 weeks gestation  Screens for trisomy 21, trisomy 18, trisomy 13, and sex chromosome aneuploidies  Cannot screen for open neural tube defects, maternal serum AFP after 15 weeks is recommended    We discussed the following ultrasound options:    Nuchal translucency (NT) ultrasound  Ultrasound between [id number] that includes nuchal translucency measurement and nasal bone assessments  Nuchal translucency refers to the space at the back of the neck where fluid builds up. All babies at this stage have fluid and there is only concern if there is too much fluid  Nasal bone refers to the small bone in the nose. There is concern for conditions like Down syndrome if the bone cannot be seen at all  This ultrasound can be done as part of first trimester screening, at the same time as another screen (NIPT), at the same time as a CVS, or if the patients does not want genetic screening.  Markers on ultrasound detects about 70% of pregnancies with aneuploidy  Abnormalities on NT ultrasound can also increase the risk for a birth defect, like a heart defect    Comprehensive level II ultrasound (Fetal Anatomy Ultrasound)  Ultrasound done between 18-20 weeks gestation  Screens for major birth defects and markers for aneuploidy (like trisomy 21 and trisomy 18)  Includes looking at the fetus/baby's growth, heart, organs (stomach, kidneys), placenta, and  amniotic fluid    We discussed the following diagnostic options:     Chorionic villus sampling (CVS)  Invasive diagnostic procedure done between 10w0d and 13w6d  The procedure collects a small sample from the placenta for the purpose of chromosomal testing and/or other genetic testing  Diagnostic result; more than 99% sensitivity for fetal chromosome abnormalities  Cannot screen for open neural tube defects, maternal serum AFP after 15 weeks is recommended    Amniocentesis  Invasive diagnostic procedure done after 15 weeks gestation  The procedure collects a small sample of amniotic fluid for the purpose of chromosomal testing and/or other genetic testing  Diagnostic result; more than 99% sensitivity for fetal chromosome abnormalities  Testing for AFP in the amniotic fluid can test for open neural tube defects      It was a pleasure to be involved with Vy [facility]. Face-to-face time of the meeting was 100 minutes.    Jeffery Lyon GC, MS, [facility]  Board Certified and [place] Licensed Genetic Counselor   [facility]  Maternal Fetal Medicine  Office: [phone]  [facility]: [phone]   Fax: [phone]  [facility]

## 2025-04-07 ENCOUNTER — MYC MEDICAL ADVICE (OUTPATIENT)
Dept: OBGYN | Facility: CLINIC | Age: 20
End: 2025-04-07
Payer: COMMERCIAL

## 2025-04-07 ENCOUNTER — TELEPHONE (OUTPATIENT)
Dept: MATERNAL FETAL MEDICINE | Facility: CLINIC | Age: 20
End: 2025-04-07
Payer: COMMERCIAL

## 2025-04-07 NOTE — TELEPHONE ENCOUNTER
April 7, 2025    I called and left a voicemail for Vy to discuss her expanded carrier screening results ([employer] Comprehensive Carrier Screen, 176 conditions). Vy was found to be a carrier for 1 condition on the panel, described below. Vy 's partner has not completed carrier screening at this time.     Most of the conditions on the carrier screening panel are inherited in an autosomal recessive fashion. Every individual has two copies of the gene that is responsible for this condition, and if someone has a change or mutation that impacts how one copy of the gene functions, they are called a carrier for the condition.  If someone has two copies of the gene that have a harmful change, they are affected with the condition.  If two people who are carriers for the same condition have children, there is a chance for their children to be affected.  Each parent has a 50% chance for passing on their copy of the gene with a mutation, so there is a 25% chance for each pregnancy to get two copies of the mutation and be affected, a 50% chance for each pregnancy to be an unaffected carrier, and a 25% chance to be unaffected with two normal copies of the gene.    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   This is a variable condition, with the most severe form presenting during pregnancy with IUGR, hydrops, and dilated cardiomyopathy. Infants with the condition can present with failure to thrive, microcephaly, siezures and encephalopathy, intellectual disability, and anemia.   Symptoms can sometimes present later, with psychiatric concerns, cognitive decline, and weakness of the lower limbs.   Early treatment includes B12 supplementation,  which can help reduce the effects of the condition. There are some studies that have shown that maternal supplementation during a pregnancy with an affected fetus can mitigate the most severe effects.   Given the carrier frequency for this condition, the reproductive risk without partner carrier screening is 1/630.      Again, while the chances of the aforementioned conditions are low, because the detection rate of testing is not 100%, if there is ever concern for symptoms in the couple's children in the future, referral to an appropriate practitioner for evaluation is recommended.       Vy's children will have a 50% chance of being an unaffected carrier of the aforementioned conditions.  Genetic counseling for her children is recommended when they are of reproductive age.    We discussed that Vy can share this information with relatives if she feels comfortable. Siblings would be at a 50% chance of also being a carrier for the same condition.     A copy of this result will be available in Epic. She was encouraged to call with any questions or if her partner is interested in carrier screening for this condition.    Jeffery Lyon MS, [facility]  Board Certified and [place] Licensed Genetic Counselor  [facility]  Maternal Fetal Medicine  Office: [phone]  [facility]: [phone]   Fax: [phone]

## 2025-04-10 NOTE — TELEPHONE ENCOUNTER
PCP did FSH per patient request and then stated to patient to have OBGYN office interpret results since she wasn't an expert. Pt was 6 days late for cycle and experiencing cramping with breast tenderness and thought she was pregnant. Message sent to midwives.

## 2025-04-20 ENCOUNTER — VIRTUAL VISIT (OUTPATIENT)
Dept: URGENT CARE | Facility: CLINIC | Age: 20
End: 2025-04-20
Payer: COMMERCIAL

## 2025-04-20 DIAGNOSIS — M54.50 ACUTE LEFT-SIDED LOW BACK PAIN WITHOUT SCIATICA: Primary | ICD-10-CM

## 2025-04-20 PROCEDURE — 98001 SYNCH AUDIO-VIDEO NEW LOW 30: CPT

## 2025-04-20 RX ORDER — CYCLOBENZAPRINE HCL 10 MG
10 TABLET ORAL 3 TIMES DAILY PRN
Qty: 21 TABLET | Refills: 0 | Status: SHIPPED | OUTPATIENT
Start: 2025-04-20 | End: 2025-04-27

## 2025-04-20 RX ORDER — NAPROXEN 500 MG/1
500 TABLET ORAL 2 TIMES DAILY WITH MEALS
Qty: 14 TABLET | Refills: 0 | Status: SHIPPED | OUTPATIENT
Start: 2025-04-20 | End: 2025-04-27

## 2025-04-21 NOTE — PROGRESS NOTES
Vy is a 19 year old female who presents for a billable video visit.    ASSESSMENT/PLAN:  Diagnoses and all orders for this visit:    Acute left-sided low back pain without sciatica  -     naproxen (NAPROSYN) 500 MG tablet; Take 1 tablet (500 mg) by mouth 2 times daily (with meals) for 7 days.  -     cyclobenzaprine (FLEXERIL) 10 MG tablet; Take 1 tablet (10 mg) by mouth 3 times daily as needed for muscle spasms.        Follow up with primary care provider with any problems, questions or concerns or if symptoms worsen or fail to improve. Patient agreed to plan and verbalized understanding.     SUBJECTIVE:    Patient reports left sided back pain without radiation which started today. She describes the pain as sharp, cramping, rated at 7/10. She has tried taking ibuprofen 400 mg which did not help. She denies bowel and bladder incontinence and saddle anesthesia     ROS: Pertinent ROS neg other than the symptoms noted above in the HPI.     OBJECTIVE:  Vitals not done due to this being a virtual visit    GENERAL: healthy, alert and no distress  EYES: Eyes grossly normal to inspection,conjunctivae and sclerae normal  RESP: Able to speak in complete sentences, no audible wheeze or cough  SKIN: no suspicious lesions or rashes  NEURO: mentation intact and speech normal  PSYCH: mentation appears normal, affect normal/bright    Video-Visit Details    Type of service:  Video Visit  Video Start Time: 9:24 pm  Video End Time: 9:34 pm    Originating Location: Home    Distant Location:  [facility]     Platform used for Video Visit: Shaggy Garcia PA-C

## 2025-04-21 NOTE — PATIENT INSTRUCTIONS
Naproxen as needed daily with food or milk which may be alternated with acetaminophen 500 to 1000 mg every 6 hours as needed.  If pain more managed, decrease doses.     Perform hot epsom salt soaks, light stretching, lidocaine patches as needed.

## 2025-04-25 ENCOUNTER — TELEPHONE (OUTPATIENT)
Dept: DERMATOLOGY | Facility: CLINIC | Age: 20
End: 2025-04-25
Payer: COMMERCIAL

## 2025-04-29 NOTE — TELEPHONE ENCOUNTER
"Spoke with Vy regarding the referral/history.    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 MRI demonstrates an abutment to the axillary vessels. Surgical excision completed by Dr. Lau in 01/2006 and again in 02/2006. No other procedures.     [facility]  throughout her life. Lost to follow up until recently when she decided she should have it checked out.     Recent MRI of the spine-- but that from that was back in 2018. Will work on getting these pushed into our system along with records.       The lesion does not cause her any pain. Requested photos but she states there is nothing to see on the outside \"only internally\".    I explained the referral process and that I should get back to her some time next week. She was understanding and agreeable.     Sheila MONTERROSO  Care Coordinator for the [facility]ascular Anomalies Clinic  Clinic support for the Pediatric Dermatology Clinic  Phone: [phone]  Fax: [phone]  E-mail: [email or web address]    "

## 2025-06-30 ENCOUNTER — OFFICE VISIT (OUTPATIENT)
Dept: DERMATOLOGY | Facility: CLINIC | Age: 20
End: 2025-06-30
Attending: DERMATOLOGY
Payer: COMMERCIAL

## 2025-06-30 VITALS — BODY MASS INDEX: 20.29 KG/M2 | WEIGHT: 107.36 LBS

## 2025-06-30 DIAGNOSIS — L30.9 DERMATITIS: ICD-10-CM

## 2025-06-30 DIAGNOSIS — L70.0 ACNE VULGARIS: Primary | ICD-10-CM

## 2025-06-30 DIAGNOSIS — O03.9 MISCARRIAGE: ICD-10-CM

## 2025-06-30 DIAGNOSIS — Q27.9 VASCULAR MALFORMATION: ICD-10-CM

## 2025-06-30 LAB
BASOPHILS # BLD AUTO: 0 10E3/UL (ref 0–0.2)
BASOPHILS NFR BLD AUTO: 1 %
D DIMER PPP FEU-MCNC: <0.27 UG/ML FEU (ref 0–0.5)
EOSINOPHIL # BLD AUTO: 0.1 10E3/UL (ref 0–0.7)
EOSINOPHIL NFR BLD AUTO: 2 %
ERYTHROCYTE [DISTWIDTH] IN BLOOD BY AUTOMATED COUNT: 12 % (ref 10–15)
HCT VFR BLD AUTO: 41.9 % (ref 35–47)
HCYS SERPL-SCNC: 4.2 UMOL/L (ref 0–15)
HGB BLD-MCNC: 14.6 G/DL (ref 11.7–15.7)
HOLD SPECIMEN: NORMAL
IMM GRANULOCYTES # BLD: 0 10E3/UL
IMM GRANULOCYTES NFR BLD: 0 %
INR PPP: 1.05 (ref 0.85–1.15)
LABORATORY COMMENT REPORT: NEGATIVE
LABORATORY COMMENT REPORT: NORMAL
LYMPHOCYTES # BLD AUTO: 2 10E3/UL (ref 0.8–5.3)
LYMPHOCYTES NFR BLD AUTO: 36 %
MCH RBC QN AUTO: 31.4 PG (ref 26.5–33)
MCHC RBC AUTO-ENTMCNC: 34.8 G/DL (ref 31.5–36.5)
MCV RBC AUTO: 90 FL (ref 78–100)
MONOCYTES # BLD AUTO: 0.4 10E3/UL (ref 0–1.3)
MONOCYTES NFR BLD AUTO: 8 %
NEUTROPHILS # BLD AUTO: 3 10E3/UL (ref 1.6–8.3)
NEUTROPHILS NFR BLD AUTO: 53 %
NRBC # BLD AUTO: 0 10E3/UL
NRBC BLD AUTO-RTO: 0 /100
PLATELET # BLD AUTO: 256 10E3/UL (ref 150–450)
PROTHROMBIN TIME: 14.3 SECONDS (ref 11.8–14.8)
RBC # BLD AUTO: 4.65 10E6/UL (ref 3.8–5.2)
SCREEN APTT/NORMAL: 1.28
SCREEN DRVVT/NORMAL: 0.82 %
WBC # BLD AUTO: 5.6 10E3/UL (ref 4–11)

## 2025-06-30 PROCEDURE — 86146 BETA-2 GLYCOPROTEIN ANTIBODY: CPT | Performed by: DERMATOLOGY

## 2025-06-30 PROCEDURE — 83090 ASSAY OF HOMOCYSTEINE: CPT | Performed by: DERMATOLOGY

## 2025-06-30 PROCEDURE — G0463 HOSPITAL OUTPT CLINIC VISIT: HCPCS | Performed by: DERMATOLOGY

## 2025-06-30 PROCEDURE — 85610 PROTHROMBIN TIME: CPT | Performed by: DERMATOLOGY

## 2025-06-30 PROCEDURE — 36415 COLL VENOUS BLD VENIPUNCTURE: CPT | Performed by: DERMATOLOGY

## 2025-06-30 PROCEDURE — 85730 THROMBOPLASTIN TIME PARTIAL: CPT | Performed by: DERMATOLOGY

## 2025-06-30 PROCEDURE — 85307 ASSAY ACTIVATED PROTEIN C: CPT | Performed by: DERMATOLOGY

## 2025-06-30 PROCEDURE — 85397 CLOTTING FUNCT ACTIVITY: CPT | Performed by: DERMATOLOGY

## 2025-06-30 PROCEDURE — 86147 CARDIOLIPIN ANTIBODY EA IG: CPT | Performed by: DERMATOLOGY

## 2025-06-30 PROCEDURE — 85379 FIBRIN DEGRADATION QUANT: CPT | Performed by: DERMATOLOGY

## 2025-06-30 PROCEDURE — 85025 COMPLETE CBC W/AUTO DIFF WBC: CPT | Performed by: DERMATOLOGY

## 2025-06-30 RX ORDER — AZELAIC ACID 0.15 G/G
GEL TOPICAL
Qty: 50 G | Refills: 3 | Status: SHIPPED | OUTPATIENT
Start: 2025-06-30

## 2025-06-30 RX ORDER — HYDROCORTISONE 25 MG/G
OINTMENT TOPICAL
Qty: 30 G | Refills: 1 | Status: SHIPPED | OUTPATIENT
Start: 2025-06-30

## 2025-06-30 NOTE — PROGRESS NOTES
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 Acne vulgaris (Primary)  Comedonal and inflammatory with post inflammatory pigment change and some scarring. Using Altreno with some improvement. Continue. Increase to prescription azelaic acid 15% daily, sun protection, benzoyl peroxide wash.   - azelaic acid (FINACIA) 15 % external gel; To face every am  Dispense: 50 g; Refill: 3    2. Miscarriages x3. Patients with large venous malformations may have localized intravascular coagulopathy, but no known VM.   Negative NATHALY, MARK panel. +TPO. Normal TSH. Heterozygous for MMA/homocystinuria  - CBC with platelets and differential; Future  - Lupus Anticoagulant Panel; Future  - Prothrombin fragment; Future  - INR; Future  - D dimer quantitative; Future  - Activated protein C resistance; Future  - Beta 2 Glycoprotein 1 Antibody IgG; Future  - Beta 2 Glycoprotein 1 Antibody IgM; Future  - Cardiolipin Ericka IgG and IgM; Future  - Homocysteine; Future  - CBC with platelets and differential  - Lupus Anticoagulant Panel  - Prothrombin fragment  - INR  - D dimer quantitative  - Activated protein C resistance  - Beta 2 Glycoprotein 1 Antibody IgG  - Beta 2 Glycoprotein 1 Antibody IgM  - Cardiolipin Ericka IgG and IgM  - Homocysteine    3. Lymphatic malformation of the R flank s/p excision x2 by peds surgery in 2006. No recurrence for the LM. Has a capillary stain on the L dorsal forearm with arm swelling and increased warmth. Will plan to image prior to [facility] assessment.   - MR Forearm Left w/o & w Contrast; Future  - MR Shoulder Left  w/o & w Contrast; Future  - MR Humerus Upper Arm Left w/o & w Contrast; Future    4. Dermatitis  Pruritus to the external . Atopic tendencies with keratosis pilaris. Recommended avoiding current over the counter products/washing and using hypoallergenic cleanser, Vaseline. May use hydrocortisone 2.5% ointment BID prn.   - hydrocortisone 2.5 % ointment; Twice daily to genital rash as needed. 30g=1 month  Dispense: 30 g; Refill: 1      Return to clinic in:  [facility] in August.     Thank you for this consultation.     Yaneth Solorzano MD   of Dermatology  Division of Pediatric Dermatology  [facility]      CC:     No referring provider defined for this encounter.    ______________________________________________________________________    Patient presents with:  New Patient: Lymphatic malformation      HPI:  It was my pleasure to see Vy Gonzalez, a 19 year old female today for initial evaluation of several concerns at the request of [facility]. T  -Acne- using Altreno prescription from online, azelaic acid 10%, benzoyl peroxide wash. Has copper IUD. Ance improving but has post inflammatory pigment change.   -Vascular malformations- excision of Lymphatic malformation on the R flank at age 1 years. Noticing a pink patch on the L dorsal forearm. No limb size discrepancy. Arm often feels warm. No imaging to arm. Has bilateral hand and arm swell. This has been ongoing as long as she can remember. No  clear triggers.  Has intermittent numbness of hands and feet in Jan.   -Vaginal itching. STD testing negative. Worse at the end of menses. Using a wash with plant materials and Dial soap.   -Miscarriages x3. Family history of mgma with several miscarriages and a maternal aunt. Had lab testing as above.     REVIEW OF SYSTEMS:    Normal growth and development. No fevers, vomiting, cough, oral ulcers, other skin concerns, vision or hearing problems, chest pain,  joint pains/ swelling, headaches, diarrhea, constipation, weakness, mood or behavior concerns, heat or cold intolerance.     Patient Active Problem List   Diagnosis    Family history of genetic disease carrier    SAB (spontaneous )           Current Outpatient Medications   Medication Sig Dispense Refill    azelaic acid (FINACIA) 15 % external gel To face every am 50 g 3    hydrocortisone 2.5 % ointment Twice daily to genital rash as needed. 30g=1 month 30 g 1    omega 3 1000 MG CAPS Take by mouth.       No current facility-administered medications for this visit.       No Known Allergies      FAMILY HX:no family history of vascular lesions. Several miscarriages in mgma and aunt    EXAM:   Wt 48.7 kg (107 lb 5.8 oz)   BMI 20.29 kg/m      Gen: Alert. No distress.   HEENT: Conjunctivae clear  Skin exam: Skin exam included scalp, face, neck, chest, abdomen, arms, legs, hands, feet, buttocks, and genital area. Skin exam was normal except for:   -Curvilinear scar on the R superior flank to axilla. No induration.   -. Follicularly based hyperkeratotic papules on the lateral upper arms  - Xerosis of the arms, legs  - Faint reticulate pink patch on the dorsal L forearm. No limb size discrepancy  - Atrophic pink macules on the cheeks, forehead with few comedones  - Mild erythema of the labia minora. Normal anatomy. No fissures.

## 2025-06-30 NOTE — PATIENT INSTRUCTIONS
[facility]  Pediatric Dermatology [facility]    MD Chanel Barrios MD Christina Boull, MD Deana Gruenhagen, PA-C Josie Thurmond, MD Monika Hand MD    Important Numbers:  RN Care Coordinators (Non-urgent calls): [phone]    Selina Harper & Gao, RN   Vascular Anomalies Clinic: [phone]    Sheila MONTERROSO CMA Care Coordinator   Complex : [phone]    Hamida VENCES    Scheduling Information:   Pediatric Appointment Scheduling and Call Center: [phone]   Radiology Scheduling: [phone]   Sedation Unit Scheduling: [phone]    Main  Services: [phone]    Welsh: [phone]    Burkinan: [phone]    Hmong/Guyanese/Uruguayan: [phone]    Refills:  If you need a prescription refill, please contact your pharmacy.   Refills are approved or denied by our physicians during normal business hours (Monday- Fridays).  Per office policy, refills will not be granted if you have not been seen within the past year (or sooner depending on your child's condition and medications).  Fax number for refills: [phone]    Preadmission Nursing Department Fax Number: [phone]  (Please fax all pre-operative paperwork to this number).    For urgent matters arising during evenings, weekends, or holidays that cannot wait for normal business hours, please call [phone] and ask for the Dermatology Resident On-Call to be paged.    ------------------------------------------------------------------------------------------------------------       Pediatric Dermatology  [address] [address]  [phone]    General Gentle Skin Care Recommendations  The products listed are not exclusive and generic products or others not on the list may be an okay substitute, but make sure they are fragrance free and reading labels is very important    Below is a list of  products our providers recommend for gentle skin care.  Moisturizers:    Lighter; Cetaphil Cream, CeraVe Cream, Aveeno Positively Radiant, Pipette, Vanicream lotion    Thicker; Aquaphor Ointment, Vaseline, Petroleum Jelly, Eucerin Original Healing Cream, Vanicream Cream, CeraVe Healing Ointment, Aquaphor Body Spray, Vanicream    Lotions are too thin to provide adequate moisture to the skin. Thicker options such as creams or ointments are recommended  Mild Cleansers:    Dove- Fragrance Free bar or wash  CeraVe   Eucerin Baby  Vanicream Cleansing bar  Cetaphil Cleanser   Aquaphor 2 in1 Gentle Wash and Shampoo  Dove Baby wash  Pipette Fragrance Free  CLn wash        Laundry Products:    All Free and Clear  Cheer Free  Generic Brands are okay if they are  Fragrance Free      Avoid fabric softeners and dryer sheets. These add unnecessary chemicals to clothing Sunscreens: SPF 30 or greater     Choose mineral-based sunscreens with active ingredients of only Zinc Oxide and/or Titanium Dioxide   It is safe to apply a small amount of mineral-based sunscreen to sun-exposed skin on infants under 6 months of age (face, hands, etc.)     Examples:  Aveeno Active Natural Protection Mineral Block Lotion SPF 30  Blue Lizard for Sensitive Skin SPF 30+  Mustella Broad Spectrum SPF 50+/Mineral Sunscreen Stick    Thinkbaby Safe Sunscreen SPF 50+  Vanicream Sunscreen for Sensitive Skin SPF 30 or 50  [facility] s Sensitive Skin SPF 70    Avoid spray sunscreens. Most contain chemical sunscreen ingredients and can be easily inhaled during application     Shampoo and Conditioners:  (Some baby washes may be used as a shampoo)    Free and Clear by Vanicream  Aquaphor 2 in 1 Gentle Wash and Shampoo  Pipette Baby Fragrance Free  Mustela Fragrance Free   Sheen Shampoo   CLn Shampoo    Oils:  Mineral Oil   Coconut (raw, unrefined, organic)   Sunflower seed oil     Avoid olive oil   Avoid essential oils (see below)         Why fragrance free?  Infant  skin is thinner than adult skin and is more prone to irritation and absorption of fragrance or chemical ingredients. Fragrances can irritate the skin of infants and children with eczema.     Why avoid essential oils on the skin?  Essential oils like lavender are very concentrated and will be absorbed into an infant s skin.   Essential oils can be very irritating and cause severe rash on the skin   Lavender and other essential oils are commonly found in baby care products. When these products are applied repeatedly to the skin and/or occluded in the diaper region, this can enhance the risk for absorption or irritation.       What about  organic  or  natural  products?  Organic or natural does not mean  fragrance free  or gentle. In fact, many organic products are very irritating to the skin  Patients with sensitive skin may be sensitive to ingredients like fragrance, essential oils, or botanical extracts in these products.    Bathing and moisturization recommendations:  Bathe once daily. Soaking in a bath is more hydrating for the skin than a shower.  Keep bathing and showering to less than 15 minutes   Use warm water, but AVOID HOT or COLD water  DO NOT use bubble bath or other products which excessively foam. These strip moisture from the skin  Limit the use of soaps, focusing on the skin folds, face, armpits, groin and feet.  Do NOT vigorously scrub when you cleanse the skin or use a loofa  After bathing, PAT your skin lightly with a towel. DO NOT rub or scrub when drying  ALWAYS apply moisturizer immediately after bathing. This helps to  lock in  the moisture.   Reapply moisturizers at least twice daily to your whole body   Your provider may recommend a lighter or heavier moisturizer based on your child s skin condition.  We recommend ointment-based moisturizers or thick creams. Avoid lotions as they are not thick enough to hydrate the skin and often contain irritating chemicals and preservatives  Lotions and  thinner creams can sting and burn when applied. Ointment-based moisturizers are better tolerated when skin is inflamed or if there are open wounds.   If you were prescribed a topical medication, follow the instructions for application as provided by your pediatric dermatology provider, but typically these should be applied first before applying moisturizer    Other helpful tips:  Avoid scented products such as powders, perfumes, or colognes  Essential oil diffusers can be harsh on sensitive skin, use with caution   Avoid saunas and steam baths. (This temperature is too HOT)  Choose breathable clothing such as cotton or bamboo   Avoid tight or  scratchy  clothing such as wool or polyester   Always wash new clothing before wearing them for the first time  Sometimes a humidifier or vaporizer can be used at night to help the dry skin. Remember to keep these items clean to avoid mold growth

## 2025-06-30 NOTE — LETTER
6/30/2025      RE: Vy Gonzalez  [address]  [place] [address]     Dear Colleague,    Thank you for the opportunity to participate in the care of your patient, Vy Gonzalez, at the [facility] at [facility]. Please see a copy of my visit note below.    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 Acne vulgaris (Primary)  Comedonal and inflammatory with post inflammatory pigment change and some scarring. Using Altreno with some improvement. Continue. Increase to prescription azelaic acid 15% daily, sun protection, benzoyl peroxide wash.   - azelaic acid (FINACIA) 15 % external gel; To face every am  Dispense: 50 g; Refill: 3    2. Miscarriages x3. Patients with large venous malformations may have localized intravascular coagulopathy, but no known VM.   Negative NATHALY, MARK panel. +TPO. Normal TSH. Heterozygous for MMA/homocystinuria  - CBC with platelets and differential; Future  - Lupus Anticoagulant Panel; Future  - Prothrombin fragment; Future  - INR; Future  - D dimer quantitative; Future  - Activated protein C resistance; Future  - Beta 2 Glycoprotein 1 Antibody IgG; Future  - Beta 2 Glycoprotein 1 Antibody IgM; Future  - Cardiolipin Ericka IgG and IgM; Future  - Homocysteine; Future  - CBC with platelets and differential  - Lupus Anticoagulant Panel  - Prothrombin fragment  - INR  - D dimer quantitative  - Activated protein C resistance  - Beta 2 Glycoprotein 1 Antibody IgG  -  Beta 2 Glycoprotein 1 Antibody IgM  - Cardiolipin Ericka IgG and IgM  - Homocysteine    3. Lymphatic malformation of the R flank s/p excision x2 by peds surgery in 2006. No recurrence for the LM. Has a capillary stain on the L dorsal forearm with arm swelling and increased warmth. Will plan to image prior to [facility] assessment.   - MR Forearm Left w/o & w Contrast; Future  - MR Shoulder Left w/o & w Contrast; Future  - MR Humerus Upper Arm Left w/o & w Contrast; Future    4. Dermatitis  Pruritus to the external . Atopic tendencies with keratosis pilaris. Recommended avoiding current over the counter products/washing and using hypoallergenic cleanser, Vaseline. May use hydrocortisone 2.5% ointment BID prn.   - hydrocortisone 2.5 % ointment; Twice daily to genital rash as needed. 30g=1 month  Dispense: 30 g; Refill: 1      Return to clinic in:  [facility] in August.     Thank you for this consultation.     Yaneth Solorzano MD   of Dermatology  Division of Pediatric Dermatology  [facility]      CC:     No referring provider defined for this encounter.    ______________________________________________________________________    Patient presents with:  New Patient: Lymphatic malformation      HPI:  It was my pleasure to see Vy Gonzalez, a 19 year old female today for initial evaluation of several concerns at the request of [facility]. T  -Acne- using Altreno prescription from online, azelaic acid 10%, benzoyl peroxide wash. Has copper IUD. Ance improving but has post inflammatory pigment change.   -Vascular malformations- excision of Lymphatic malformation on the R flank at age 1 years. Noticing a pink patch on the L dorsal forearm. No limb size discrepancy. Arm often feels warm. No imaging to arm. Has bilateral hand and arm swell. This has been ongoing as long as she can remember. No  clear triggers.  Has intermittent numbness of hands and feet in Jan.    -Vaginal itching. STD testing negative. Worse at the end of menses. Using a wash with plant materials and Dial soap.   -Miscarriages x3. Family history of mgma with several miscarriages and a maternal aunt. Had lab testing as above.     REVIEW OF SYSTEMS:    Normal growth and development. No fevers, vomiting, cough, oral ulcers, other skin concerns, vision or hearing problems, chest pain, joint pains/ swelling, headaches, diarrhea, constipation, weakness, mood or behavior concerns, heat or cold intolerance.     Patient Active Problem List   Diagnosis     Family history of genetic disease carrier     SAB (spontaneous )           Current Outpatient Medications   Medication Sig Dispense Refill     azelaic acid (FINACIA) 15 % external gel To face every am 50 g 3     hydrocortisone 2.5 % ointment Twice daily to genital rash as needed. 30g=1 month 30 g 1     omega 3 1000 MG CAPS Take by mouth.       No current facility-administered medications for this visit.       No Known Allergies      FAMILY HX:no family history of vascular lesions. Several miscarriages in mgma and aunt    EXAM:   Wt 48.7 kg (107 lb 5.8 oz)   BMI 20.29 kg/m      Gen: Alert. No distress.   HEENT: Conjunctivae clear  Skin exam: Skin exam included scalp, face, neck, chest, abdomen, arms, legs, hands, feet, buttocks, and genital area. Skin exam was normal except for:   -Curvilinear scar on the R superior flank to axilla. No induration.   -. Follicularly based hyperkeratotic papules on the lateral upper arms  - Xerosis of the arms, legs  - Faint reticulate pink patch on the dorsal L forearm. No limb size discrepancy  - Atrophic pink macules on the cheeks, forehead with few comedones  - Mild erythema of the labia minora. Normal anatomy. No fissures.         Please do not hesitate to contact me if you have any questions/concerns.     Sincerely,       Yaneth Solorzano MD

## 2025-06-30 NOTE — NURSING NOTE
Chief Complaint   Patient presents with    New Patient     Lymphatic malformation   Sheila Joyce, CMA

## 2025-07-01 LAB
APCR PPP: 3.09 {RATIO}
B2 GLYCOPROT1 IGG SERPL IA-ACNC: <0.8 U/ML
B2 GLYCOPROT1 IGM SERPL IA-ACNC: <2.4 U/ML
CARDIOLIPIN IGG SER IA-ACNC: 5 GPL-U/ML
CARDIOLIPIN IGG SER IA-ACNC: NEGATIVE
CARDIOLIPIN IGM SER IA-ACNC: <2 MPL-U/ML
CARDIOLIPIN IGM SER IA-ACNC: NEGATIVE
PRO FRG1+2 SERPL-SCNC: 0.1 NMOL/L

## 2025-07-31 ENCOUNTER — TELEPHONE (OUTPATIENT)
Dept: CONSULT | Facility: CLINIC | Age: 20
End: 2025-07-31
Payer: COMMERCIAL

## 2025-07-31 NOTE — TELEPHONE ENCOUNTER
LVM for patient to call back to schedule new patient Genetics appointment with Dr. West in Vascular Anomalies Clinic. Call center number provided, OK to schedule in held time on 1st or 4th Wednesday of the month. Please schedule GC visit 30 minutes prior to MD appointment. Thank you.

## 2025-08-06 ENCOUNTER — OFFICE VISIT (OUTPATIENT)
Dept: DERMATOLOGY | Facility: CLINIC | Age: 20
End: 2025-08-06
Attending: STUDENT IN AN ORGANIZED HEALTH CARE EDUCATION/TRAINING PROGRAM
Payer: COMMERCIAL

## 2025-08-06 ENCOUNTER — OFFICE VISIT (OUTPATIENT)
Dept: DERMATOLOGY | Facility: CLINIC | Age: 20
End: 2025-08-06
Attending: DERMATOLOGY
Payer: COMMERCIAL

## 2025-08-06 VITALS
SYSTOLIC BLOOD PRESSURE: 119 MMHG | DIASTOLIC BLOOD PRESSURE: 79 MMHG | WEIGHT: 107.36 LBS | HEIGHT: 61 IN | BODY MASS INDEX: 20.27 KG/M2

## 2025-08-06 VITALS
WEIGHT: 107.4 LBS | HEIGHT: 61 IN | DIASTOLIC BLOOD PRESSURE: 79 MMHG | SYSTOLIC BLOOD PRESSURE: 119 MMHG | BODY MASS INDEX: 20.28 KG/M2

## 2025-08-06 DIAGNOSIS — Q89.9 LYMPHATIC MALFORMATION: Primary | ICD-10-CM

## 2025-08-06 DIAGNOSIS — L21.9 DERMATITIS, SEBORRHEIC: ICD-10-CM

## 2025-08-06 DIAGNOSIS — L70.0 ACNE VULGARIS: Primary | ICD-10-CM

## 2025-08-06 DIAGNOSIS — Q27.9 VASCULAR MALFORMATION: ICD-10-CM

## 2025-08-06 PROCEDURE — G0463 HOSPITAL OUTPT CLINIC VISIT: HCPCS | Performed by: DERMATOLOGY

## 2025-08-06 PROCEDURE — G0463 HOSPITAL OUTPT CLINIC VISIT: HCPCS | Performed by: STUDENT IN AN ORGANIZED HEALTH CARE EDUCATION/TRAINING PROGRAM

## 2025-08-06 RX ORDER — MOMETASONE FUROATE 1 MG/ML
LOTION TOPICAL
Qty: 60 ML | Refills: 5 | Status: SHIPPED | OUTPATIENT
Start: 2025-08-06

## 2025-08-06 RX ORDER — ADAPALENE GEL USP, 0.3% 3 MG/G
GEL TOPICAL
Qty: 45 G | Refills: 11 | Status: SHIPPED | OUTPATIENT
Start: 2025-08-06

## 2025-08-21 ENCOUNTER — OFFICE VISIT (OUTPATIENT)
Dept: MIDWIFE SERVICES | Facility: CLINIC | Age: 20
End: 2025-08-21
Payer: COMMERCIAL

## 2025-08-21 ENCOUNTER — LAB (OUTPATIENT)
Dept: LAB | Facility: CLINIC | Age: 20
End: 2025-08-21
Payer: COMMERCIAL

## 2025-08-21 VITALS
BODY MASS INDEX: 19.64 KG/M2 | HEIGHT: 62 IN | HEART RATE: 68 BPM | DIASTOLIC BLOOD PRESSURE: 72 MMHG | SYSTOLIC BLOOD PRESSURE: 110 MMHG | WEIGHT: 106.7 LBS

## 2025-08-21 DIAGNOSIS — R23.2 HOT FLASHES: ICD-10-CM

## 2025-08-21 DIAGNOSIS — N93.0 POSTCOITAL BLEEDING: ICD-10-CM

## 2025-08-21 DIAGNOSIS — Z30.431 ENCOUNTER FOR MANAGEMENT OF INTRAUTERINE CONTRACEPTIVE DEVICE (IUD), UNSPECIFIED IUD MANAGEMENT TYPE: Primary | ICD-10-CM

## 2025-08-21 LAB
ALBUMIN UR-MCNC: NEGATIVE MG/DL
APPEARANCE UR: CLEAR
BASOPHILS # BLD AUTO: 0.03 10E3/UL (ref 0–0.2)
BASOPHILS NFR BLD AUTO: 0.6 %
BILIRUB UR QL STRIP: NEGATIVE
CLUE CELLS: ABNORMAL
COLOR UR AUTO: YELLOW
EOSINOPHIL # BLD AUTO: 0.07 10E3/UL (ref 0–0.7)
EOSINOPHIL NFR BLD AUTO: 1.3 %
ERYTHROCYTE [DISTWIDTH] IN BLOOD BY AUTOMATED COUNT: 12.2 % (ref 10–15)
EST. AVERAGE GLUCOSE BLD GHB EST-MCNC: 105 MG/DL
GLUCOSE UR STRIP-MCNC: NEGATIVE MG/DL
HBA1C MFR BLD: 5.3 %
HCG INTACT+B SERPL-ACNC: <1 MIU/ML
HCT VFR BLD AUTO: 41.2 % (ref 35–47)
HGB BLD-MCNC: 14.7 G/DL (ref 11.7–15.7)
HGB UR QL STRIP: NEGATIVE
IMM GRANULOCYTES # BLD: <0.03 10E3/UL
IMM GRANULOCYTES NFR BLD: 0.4 %
KETONES UR STRIP-MCNC: NEGATIVE MG/DL
LEUKOCYTE ESTERASE UR QL STRIP: NEGATIVE
LYMPHOCYTES # BLD AUTO: 1.74 10E3/UL (ref 0.8–5.3)
LYMPHOCYTES NFR BLD AUTO: 33 %
MCH RBC QN AUTO: 32.8 PG (ref 26.5–33)
MCHC RBC AUTO-ENTMCNC: 35.7 G/DL (ref 31.5–36.5)
MCV RBC AUTO: 92 FL (ref 78–100)
MONOCYTES # BLD AUTO: 0.42 10E3/UL (ref 0–1.3)
MONOCYTES NFR BLD AUTO: 8 %
NEUTROPHILS # BLD AUTO: 3 10E3/UL (ref 1.6–8.3)
NEUTROPHILS NFR BLD AUTO: 56.7 %
NITRATE UR QL: NEGATIVE
NRBC # BLD AUTO: <0.03 10E3/UL
NRBC BLD AUTO-RTO: 0 /100
PH UR STRIP: 7.5 [PH] (ref 5–8)
PLATELET # BLD AUTO: 289 10E3/UL (ref 150–450)
RBC # BLD AUTO: 4.48 10E6/UL (ref 3.8–5.2)
SP GR UR STRIP: 1.02 (ref 1–1.03)
TRICHOMONAS, WET PREP: ABNORMAL
TSH SERPL DL<=0.005 MIU/L-ACNC: 0.96 UIU/ML (ref 0.3–4.2)
UROBILINOGEN UR STRIP-ACNC: 0.2 E.U./DL
WBC # BLD AUTO: 5.28 10E3/UL (ref 4–11)
WBC'S/HIGH POWER FIELD, WET PREP: ABNORMAL
YEAST, WET PREP: ABNORMAL

## 2025-08-21 PROCEDURE — 36415 COLL VENOUS BLD VENIPUNCTURE: CPT

## 2025-08-21 PROCEDURE — 84702 CHORIONIC GONADOTROPIN TEST: CPT

## 2025-08-21 PROCEDURE — 83036 HEMOGLOBIN GLYCOSYLATED A1C: CPT

## 2025-08-21 PROCEDURE — 84443 ASSAY THYROID STIM HORMONE: CPT

## 2025-08-21 PROCEDURE — 85004 AUTOMATED DIFF WBC COUNT: CPT

## 2025-08-21 RX ORDER — COPPER 313.4 MG/1
1 INTRAUTERINE DEVICE INTRAUTERINE ONCE
COMMUNITY
Start: 2025-01-01

## 2025-08-21 RX ORDER — KETOCONAZOLE 20 MG/ML
SHAMPOO, SUSPENSION TOPICAL
COMMUNITY
Start: 2025-02-13

## 2025-08-22 ENCOUNTER — ANCILLARY PROCEDURE (OUTPATIENT)
Dept: ULTRASOUND IMAGING | Facility: CLINIC | Age: 20
End: 2025-08-22
Attending: ADVANCED PRACTICE MIDWIFE
Payer: COMMERCIAL

## 2025-08-22 DIAGNOSIS — N93.0 POSTCOITAL BLEEDING: ICD-10-CM

## 2025-08-22 PROCEDURE — 76830 TRANSVAGINAL US NON-OB: CPT | Performed by: STUDENT IN AN ORGANIZED HEALTH CARE EDUCATION/TRAINING PROGRAM

## 2025-08-22 PROCEDURE — 76856 US EXAM PELVIC COMPLETE: CPT | Performed by: STUDENT IN AN ORGANIZED HEALTH CARE EDUCATION/TRAINING PROGRAM
